# Patient Record
Sex: MALE | Race: WHITE
[De-identification: names, ages, dates, MRNs, and addresses within clinical notes are randomized per-mention and may not be internally consistent; named-entity substitution may affect disease eponyms.]

---

## 2021-03-09 ENCOUNTER — NURSE TRIAGE (OUTPATIENT)
Dept: OTHER | Facility: CLINIC | Age: 86
End: 2021-03-09

## 2021-03-09 NOTE — TELEPHONE ENCOUNTER
Brief description of triage: Patient calling for concerns for left hip pain. Triage indicates for patient to see within 3 days in office. Care advice provided, patient verbalizes understanding; denies any other questions or concerns; instructed to call back for any new or worsening symptoms. This triage is a result of a call to [address] [place] [address]. Please do not respond to the triage nurse through this encounter. Any subsequent communication should be directly with the patient. Reason for Disposition   MODERATE pain (e.g., interferes with normal activities, limping) and present > 3 days    Answer Assessment - Initial Assessment Questions  1. LOCATION and RADIATION: \"Where is the pain located? \"       Left hip pain, down left leg to his knee    2. QUALITY: \"What does the pain feel like? \"  (e.g., sharp, dull, aching, burning)      Sharp pain    3. SEVERITY: \"How bad is the pain? \" \"What does it keep you from doing? \"   (Scale 1-10; or mild, moderate, severe)    -  MILD (1-3): doesn't interfere with normal activities     -  MODERATE (4-7): interferes with normal activities (e.g., work or school) or awakens from sleep, limping     -  SEVERE (8-10): excruciating pain, unable to do any normal activities, unable to walk      When he gets up sometimes it's a 7/10-10/10. Not having currently. 4. ONSET: \"When did the pain start? \" \"Does it come and go, or is it there all the time? \"      About a week ago, comes and goes    5. WORK OR EXERCISE: \"Has there been any recent work or exercise that involved this part of the body? \"       Maybe slept the wrong way    6. CAUSE: \"What do you think is causing the hip pain? \"       Unsure    7. AGGRAVATING FACTORS: \"What makes the hip pain worse? \" (e.g., walking, climbing stairs, running)      Feels it sometimes when he gets up    8. OTHER SYMPTOMS: \"Do you have any other symptoms? \" (e.g., back pain, pain shooting down leg,  fever, rash)      Possible ruptured discs, soreness in left lower back, pain into his leg to his knee, no fever, no rash/redness, unsure about swelling    Protocols used: HIP PAIN-ADULT-[place]

## 2021-04-09 ENCOUNTER — NURSE TRIAGE (OUTPATIENT)
Dept: OTHER | Facility: CLINIC | Age: 86
End: 2021-04-09

## 2021-04-09 NOTE — TELEPHONE ENCOUNTER
Reason for Disposition   SEVERE back pain (e.g., excruciating, unable to do any normal activities) and not improved after pain medicine and CARE ADVICE    Answer Assessment - Initial Assessment Questions  1. ONSET: \"When did the pain begin? \"       1 week ago    2. LOCATION: \"Where does it hurt? \" (upper, mid or lower back)      All over    3. SEVERITY: \"How bad is the pain? \"  (e.g., Scale 1-10; mild, moderate, or severe)    - MILD (1-3): doesn't interfere with normal activities     - MODERATE (4-7): interferes with normal activities or awakens from sleep     - SEVERE (8-10): excruciating pain, unable to do any normal activities       Severe    4. PATTERN: \"Is the pain constant? \" (e.g., yes, no; constant, intermittent)      Yes    5. RADIATION: \"Does the pain shoot into your legs or elsewhere? \"      No    6. CAUSE:  \"What do you think is causing the back pain? \"      Unknown    7. BACK OVERUSE:  Teo Gouty recent lifting of heavy objects, strenuous work or exercise? \"      NA    8. MEDICATIONS: \"What have you taken so far for the pain? \" (e.g., nothing, acetaminophen, NSAIDS)     Currently in therapy to help with pain    9. NEUROLOGIC SYMPTOMS: \"Do you have any weakness, numbness, or problems with bowel/bladder control? \"      NA    10. OTHER SYMPTOMS: \"Do you have any other symptoms? \" (e.g., fever, abdominal pain, burning with urination, blood in urine)        No    11. PREGNANCY: \"Is there any chance you are pregnant? \" (e.g., yes, no; LMP)        NA    Protocols used: BACK PAIN-ADULT-[place]    Brief description of triage: Back pain    Triage indicates for patient to be seen today    Care advice provided, patient verbalizes understanding; denies any other questions or concerns; instructed to call back for any new or worsening symptoms. This triage is a result of a call to [address] [place] [address]. Please do not respond to the triage nurse through this encounter.  Any subsequent communication should be directly with the patient.

## 2025-05-11 ENCOUNTER — HOSPITAL ENCOUNTER (EMERGENCY)
Facility: HOSPITAL | Age: 89
Discharge: HOME | End: 2025-05-11
Payer: MEDICARE

## 2025-05-11 VITALS
HEIGHT: 71 IN | OXYGEN SATURATION: 96 % | WEIGHT: 170 LBS | DIASTOLIC BLOOD PRESSURE: 78 MMHG | TEMPERATURE: 98.2 F | BODY MASS INDEX: 23.8 KG/M2 | HEART RATE: 75 BPM | SYSTOLIC BLOOD PRESSURE: 127 MMHG | RESPIRATION RATE: 18 BRPM

## 2025-05-11 DIAGNOSIS — Z48.00 CHANGE OR REMOVAL OF WOUND DRESSING: ICD-10-CM

## 2025-05-11 DIAGNOSIS — D04.22 SQUAMOUS CELL CARCINOMA IN SITU (SCCIS) OF SKIN OF ANTIHELIX OF LEFT EAR: Primary | ICD-10-CM

## 2025-05-11 PROCEDURE — 99281 EMR DPT VST MAYX REQ PHY/QHP: CPT

## 2025-05-11 ASSESSMENT — COLUMBIA-SUICIDE SEVERITY RATING SCALE - C-SSRS
6. HAVE YOU EVER DONE ANYTHING, STARTED TO DO ANYTHING, OR PREPARED TO DO ANYTHING TO END YOUR LIFE?: NO
2. HAVE YOU ACTUALLY HAD ANY THOUGHTS OF KILLING YOURSELF?: NO
1. IN THE PAST MONTH, HAVE YOU WISHED YOU WERE DEAD OR WISHED YOU COULD GO TO SLEEP AND NOT WAKE UP?: NO

## 2025-05-11 NOTE — ED PROVIDER NOTES
Limitations to History: None     HPI:      Marco Antonio Snowden is a 89 y.o. with significant PMH for amyloidosis, CAD, BPH, essential tremor, HTN, HLD, prior MI, trigeminal neuralgia and prior CVA presenting to ED today from home by himself for.  Patient bandage removal 3 days ago he had Mohs surgery on the left ear, he was instructed to come to the ER to have the dressing removed.  Minimal discomfort.  Denies fever/chills, cough/cold symptoms, chest pain, shortness of breath, nausea/vomiting, abdominal pain, urinary symptoms, change in habits or any other complaints.  Former smoker, no EtOH or IV drugs.  PCP dermatologist is Dr. Underwood.    Additional History Obtained from: Triage/nursing notes reviewed. Procedure note from Mohs therapy  Dr. Underwood.    ------------------------------------------------------------------------------------------------------------------------------------------    VS: As documented in the triage note and EMR flowsheet from this visit were reviewed.    Physical Exam:  Gen: Pleasant elderly  male, awake and alert and orient x 3.  Well-nourished and hydrated.  Nontoxic looking  Head/Neck: NCAT, neck w/ FROM  Eyes: EOMI, PERRL, anicteric sclerae, noninjected conjunctivae  Ears: TMs clear b/l without sign of infection  Nose: Nares patent w/o rhinorrhea  Mouth:  MMM, no OP lesions noted  Skin: Dressing antihelix left ear  dry and intact.  No rashes noted        ------------------------------------------------------------------------------------------------------------------------------------------    Medical Decision Makin y.o. with significant PMH for amyloidosis, CAD, BPH, essential tremor, HTN, HLD, prior MI, trigeminal neuralgia and prior CVA is evaluated bedside for dressing removal.  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  Advised to come to the ER for dressing removal today.  Patient is unsure exactly  how to remove the dressing.  Vital signs within normal limits.  Afebrile.  I reviewed discharge instructions from dermatology visit.    I removed the outer bandage, there appears to be a Vaseline soaked gauze in place over the surgical site.  Helix of the ear is pink, warm and dry.  No erythema, no purulent drainage.  No bleeding.  It appears the wound is to heal by secondary intention so will not remove the Vaseline gauze at this time.  Visible area was cleaned with Hibiclens and saline.  Outer dressing re-applied.  No evidence of secondary infection.  Patient will call his dermatology office tomorrow to verify further instructions.  Stable hydrated.  Advised to stay well-hydrated and resume normal medications.  Resume all other postop instructions.  Red flags and return precautions discussed.  All questions were entertained and answered.  Shared decision making conducted.  Patient verbalizes understanding of diagnosis and treatment plan.  Condition stable for discharge.    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

## 2025-05-11 NOTE — ED TRIAGE NOTES
Patient here for bandage removal/wound cleaning on left ear. Had skin cancer removed a few days ago.

## 2025-05-11 NOTE — DISCHARGE INSTRUCTIONS
"Call your dermatologist tomorrow and let them know that the \"outer dressing was removed.  Vaseline soaked gauze is still in place over the antihelix of the left ear.  We cleaned the visible area and applied a new outer dressing.  No evidence of infection.  No purulent drainage.  No bleeding.\"    Resume all other postop instructions.  Resume normal medications and stay well-hydrated.  "